# Patient Record
Sex: MALE
[De-identification: names, ages, dates, MRNs, and addresses within clinical notes are randomized per-mention and may not be internally consistent; named-entity substitution may affect disease eponyms.]

---

## 2018-07-05 ENCOUNTER — HOSPITAL ENCOUNTER (EMERGENCY)
Dept: HOSPITAL 88 - ER | Age: 68
Discharge: HOME | End: 2018-07-05
Payer: COMMERCIAL

## 2018-07-05 VITALS — HEIGHT: 72 IN | BODY MASS INDEX: 25.73 KG/M2 | WEIGHT: 190 LBS

## 2018-07-05 DIAGNOSIS — N30.90: ICD-10-CM

## 2018-07-05 DIAGNOSIS — N16: ICD-10-CM

## 2018-07-05 DIAGNOSIS — R10.32: Primary | ICD-10-CM

## 2018-07-05 DIAGNOSIS — N20.1: ICD-10-CM

## 2018-07-05 LAB
ALBUMIN SERPL-MCNC: 4.5 G/DL (ref 3.5–5)
ALBUMIN/GLOB SERPL: 1.5 {RATIO} (ref 0.8–2)
ALP SERPL-CCNC: 58 IU/L (ref 40–150)
ALT SERPL-CCNC: 34 IU/L (ref 0–55)
AMYLASE SERPL-CCNC: 93 U/L (ref 25–125)
ANION GAP SERPL CALC-SCNC: 15 MMOL/L (ref 8–16)
BACTERIA URNS QL MICRO: (no result) /HPF
BASOPHILS # BLD AUTO: 0 10*3/UL (ref 0–0.1)
BASOPHILS NFR BLD AUTO: 0.2 % (ref 0–1)
BILIRUB UR QL: NEGATIVE
BUN SERPL-MCNC: 20 MG/DL (ref 7–26)
BUN/CREAT SERPL: 14 (ref 6–25)
CALCIUM SERPL-MCNC: 10 MG/DL (ref 8.4–10.2)
CHLORIDE SERPL-SCNC: 104 MMOL/L (ref 98–107)
CLARITY UR: (no result)
CO2 SERPL-SCNC: 26 MMOL/L (ref 22–29)
COLOR UR: YELLOW
DEPRECATED NEUTROPHILS # BLD AUTO: 10.6 10*3/UL (ref 2.1–6.9)
DEPRECATED RBC URNS MANUAL-ACNC: (no result) /HPF (ref 0–5)
EGFRCR SERPLBLD CKD-EPI 2021: 49 ML/MIN (ref 60–?)
EOSINOPHIL # BLD AUTO: 0 10*3/UL (ref 0–0.4)
EOSINOPHIL NFR BLD AUTO: 0 % (ref 0–6)
EPI CELLS URNS QL MICRO: (no result) /LPF
ERYTHROCYTE [DISTWIDTH] IN CORD BLOOD: 13.3 % (ref 11.7–14.4)
GLOBULIN PLAS-MCNC: 3.1 G/DL (ref 2.3–3.5)
GLUCOSE SERPLBLD-MCNC: 133 MG/DL (ref 74–118)
HCT VFR BLD AUTO: 42.3 % (ref 38.2–49.6)
HGB BLD-MCNC: 15 G/DL (ref 14–18)
KETONES UR QL STRIP.AUTO: (no result)
LEUKOCYTE ESTERASE UR QL STRIP.AUTO: NEGATIVE
LIPASE SERPL-CCNC: 25 U/L (ref 8–78)
LYMPHOCYTES # BLD: 1.2 10*3/UL (ref 1–3.2)
LYMPHOCYTES NFR BLD AUTO: 9.5 % (ref 18–39.1)
MAGNESIUM SERPL-MCNC: 2 MG/DL (ref 1.3–2.1)
MCH RBC QN AUTO: 31.4 PG (ref 28–32)
MCHC RBC AUTO-ENTMCNC: 35.5 G/DL (ref 31–35)
MCV RBC AUTO: 88.7 FL (ref 81–99)
MONOCYTES # BLD AUTO: 1.1 10*3/UL (ref 0.2–0.8)
MONOCYTES NFR BLD AUTO: 8.3 % (ref 4.4–11.3)
NEUTS SEG NFR BLD AUTO: 81.5 % (ref 38.7–80)
NITRITE UR QL STRIP.AUTO: NEGATIVE
PLATELET # BLD AUTO: 240 X10E3/UL (ref 140–360)
POTASSIUM SERPL-SCNC: 4 MMOL/L (ref 3.5–5.1)
PROT UR QL STRIP.AUTO: NEGATIVE
RBC # BLD AUTO: 4.77 X10E6/UL (ref 4.3–5.7)
SODIUM SERPL-SCNC: 141 MMOL/L (ref 136–145)
SP GR UR STRIP: 1.01 (ref 1.01–1.02)
UROBILINOGEN UR STRIP-MCNC: 0.2 MG/DL (ref 0.2–1)
WBC #/AREA URNS HPF: (no result) /HPF (ref 0–5)

## 2018-07-05 PROCEDURE — 82150 ASSAY OF AMYLASE: CPT

## 2018-07-05 PROCEDURE — 74177 CT ABD & PELVIS W/CONTRAST: CPT

## 2018-07-05 PROCEDURE — 83690 ASSAY OF LIPASE: CPT

## 2018-07-05 PROCEDURE — 85025 COMPLETE CBC W/AUTO DIFF WBC: CPT

## 2018-07-05 PROCEDURE — 87086 URINE CULTURE/COLONY COUNT: CPT

## 2018-07-05 PROCEDURE — 36415 COLL VENOUS BLD VENIPUNCTURE: CPT

## 2018-07-05 PROCEDURE — 99284 EMERGENCY DEPT VISIT MOD MDM: CPT

## 2018-07-05 PROCEDURE — 83735 ASSAY OF MAGNESIUM: CPT

## 2018-07-05 PROCEDURE — 80053 COMPREHEN METABOLIC PANEL: CPT

## 2018-07-05 PROCEDURE — 81001 URINALYSIS AUTO W/SCOPE: CPT

## 2018-07-05 NOTE — DIAGNOSTIC IMAGING REPORT
CT Abdomen And Pelvis with Intravenous Contrast



INDICATION: Lower abdominal pain



TECHNIQUE: Thin collimation axial images obtained from the diaphragm to the

level of the pubic symphysis following the uneventful administration of  100 cc

of low osmolar, nonionic intravenous contrast.



RADIATION DOSE:

     Total DLP: 441.2  mGy*cm

     Estimated effective dose: (DLP x 0.015 x size factor) mSv

     CTDIvol has been reviewed. It is below the limits set by the Radiation

Protocol Committee (RPC).



COMPARISON: None.

 

ABDOMEN FINDINGS:



Lung Bases: Clear. There is a small hiatal hernia.



Liver: Decreased attenuation.  No evidence for mass.



Gallbladder: Present and appears normal. No biliary ductal dilatation.



Pancreas: Normal attenuation without mass or ductal dilatation.



Spleen: Normal in size.  No evidence of mass..



Adrenal Glands: No evidence for mass.



Kidneys: 



Right: Normal enhancement.  No soft tissue mass.  No hydronephrosis.



Left:  Diminutive enhancement. Diffuse perinephric inflammation. Calculus in

the lower pole measures 8 mm. Calculus in the interpolar region measures 8 mm.

The collecting system is distended. No perinephric fluid collection.  



Lymph Nodes: No enlarged abdominal or periaortic lymph nodes..



Aorta:   Normal in diameter



PELVIS FINDINGS: 



Bowel: 

Stomach:  Normal.

Small Bowel: Normal in caliber with normal wall thickness.  

Large Bowel: Normal in caliber with normal wall thickness.  

Appendix: Normal appendix.



Bladder: Mild mural thickening.

Ureters: The left ureter is distended throughout its course. No evidence of

calculus. Right ureter is normal..



The prostate gland measures 5.4 x 6.3 cm.



No free fluid or fluid collection.



Bones: Mild to moderate degenerative changes of L5-S1. No compression

deformities. There is a a lesion containing air in the right ilium measuring 15

mm near the SI joint.



Soft tissues: Small fat-containing left inguinal hernia.



IMPRESSION:



1.   Mild left hydroureteronephrosis without evidence of ureteral or bladder

calculus. A recently passed stone cannot be excluded.  Two intrarenal calculi

in the left kidney as described above.

2. Diffuse bladder wall thickening suggestive of outlet obstruction from

prostate hypertrophy.

3. Hepatic steatosis.

4. No evidence for bowel obstruction or inflammation. Small hiatal hernia.

Normal appendix.



Signed by: Dr. Parvez Lorenz MD on 7/5/2018 7:37 PM

## 2018-07-20 ENCOUNTER — HOSPITAL ENCOUNTER (OUTPATIENT)
Dept: HOSPITAL 88 - RAD | Age: 68
End: 2018-07-20
Attending: UROLOGY
Payer: COMMERCIAL

## 2018-07-20 DIAGNOSIS — N20.0: Primary | ICD-10-CM

## 2018-07-20 PROCEDURE — 74018 RADEX ABDOMEN 1 VIEW: CPT

## 2018-07-20 NOTE — DIAGNOSTIC IMAGING REPORT
PROCEDURE:X-RAY ABDOMEN - KUB

 

COMPARISON:CT of the abdomen and pelvis from 75 2018

INDICATIONS:RENAL STONE FOLLOW UP

 

FINDINGS:

One view of the abdomen (AP supine)

 

A 6 mm stone is projected over the upper pole of the left kidney

A 5 mm stone is projected over the lower pole of the left kidney.

No stones project over the expected locations of the ureters.

No right renal stones are identified.

 

There are no dilated loops of bowel to suggest obstruction. There are 

no masses evident There is no evidence of free air. No acute osseous 

abnormalities are present. There are degenerative changes of the 

sacroiliac joints.

 

 

CONCLUSION:

Left nephrolithiasis, appearing unchanged since the prior CT

 

 

Dictated by:  William Church M.D. on 7/20/2018 at 18:26     

Electronically approved by:  William Church M.D. on 7/20/2018 at 18:26

## 2018-10-18 ENCOUNTER — HOSPITAL ENCOUNTER (OUTPATIENT)
Dept: HOSPITAL 88 - RAD | Age: 68
End: 2018-10-18
Attending: UROLOGY
Payer: COMMERCIAL

## 2018-10-18 DIAGNOSIS — N20.0: Primary | ICD-10-CM

## 2018-10-18 PROCEDURE — 74018 RADEX ABDOMEN 1 VIEW: CPT

## 2018-10-18 NOTE — DIAGNOSTIC IMAGING REPORT
PROCEDURE:X-RAY ABDOMEN - KUB

 

COMPARISON:KUB 7/20/18.

 

INDICATIONS:CALCULUS OF KIDNEY

 

FINDINGS:

Bowel gas partially obscures visualization of the kidneys. Faint 

calcifications measuring 6 mm projects over the left upper kidney and 5 

mm calcification projects over the left lower kidney, likely 

representing stones. No evidence of calcification overlying the 

expected location of the ureters.  There is a non-obstructed bowel-gas 

pattern. There are no acute osseous abnormalities. The lung bases are 

clear.

 

CONCLUSION:

Exam partially limited by bowel gas obscuring the kidneys. Similar 

appearance of two 5-6 mm left renal stones. 

 

Dictated by:  KERMIT CORDOVA M.D. on 10/18/2018 at 11:16     

Electronically approved by:  KERMIT CORDOVA M.D. on 10/18/2018 at 11:16

## 2019-03-07 ENCOUNTER — HOSPITAL ENCOUNTER (OUTPATIENT)
Dept: HOSPITAL 88 - RAD | Age: 69
End: 2019-03-07
Attending: UROLOGY
Payer: MEDICARE

## 2019-03-07 DIAGNOSIS — N20.0: Primary | ICD-10-CM

## 2019-03-07 PROCEDURE — 74018 RADEX ABDOMEN 1 VIEW: CPT

## 2019-03-07 NOTE — DIAGNOSTIC IMAGING REPORT
Abdomen, 2 views dated 3/7/2019 at 9:37 AM.



History: Renal stones



Comparison: CT scan of the abdomen and pelvis dated 7/5/2018.



Findings: The intestinal gas pattern is nonobstructive. There no masses or

abnormal calcifications. Left pelvic calcification appears compatible with a

phlebolith. The osseous structures are intact.





IMPRESSION:

No acute abdominal abnormality.



Signed by: Dr. Sukhi Smith DO on 3/7/2019 11:40 AM

## 2019-07-08 ENCOUNTER — HOSPITAL ENCOUNTER (OUTPATIENT)
Dept: HOSPITAL 88 - RAD | Age: 69
End: 2019-07-08
Attending: UROLOGY
Payer: MEDICARE

## 2019-07-08 DIAGNOSIS — Z87.442: Primary | ICD-10-CM

## 2019-07-08 PROCEDURE — 74018 RADEX ABDOMEN 1 VIEW: CPT

## 2019-07-08 NOTE — DIAGNOSTIC IMAGING REPORT
Exam: KUB - 2 views



Clinical History: Renal calculi



Comparison: KUB of 3/7/2019



Findings: 

Nonobstructive bowel gas pattern. No evidence of free intraperitoneal air. No

evidence of abnormal calcification. No acute bony abnormality.



Impression:

No acute radiographic abnormality.



Signed by: Fiordaliza Dimas MD on 7/8/2019 12:36 PM

## 2019-10-01 ENCOUNTER — HOSPITAL ENCOUNTER (OUTPATIENT)
Dept: HOSPITAL 88 - RAD | Age: 69
End: 2019-10-01
Attending: UROLOGY
Payer: MEDICARE

## 2019-10-01 DIAGNOSIS — N20.0: Primary | ICD-10-CM

## 2019-10-01 PROCEDURE — 74018 RADEX ABDOMEN 1 VIEW: CPT

## 2019-10-01 NOTE — DIAGNOSTIC IMAGING REPORT
Exam: KUB - 2 views



Indication: Renal calculi



Comparison: KUB of 7/8/2019



Findings: 

No radiographically apparent renal calculi. Not certain bowel gas pattern. No

free air. The osseous structures appear unremarkable.



Impression:

No radiographically apparent renal calculi.



Signed by: Fiordaliza Dimas MD on 10/1/2019 12:54 PM

## 2020-03-03 NOTE — DIAGNOSTIC IMAGING REPORT
Exam: KUB - 2 views



Indication: Renal calculus



Comparison: Multiple prior KUBs, most recently 10/1/2019



Findings: 

No radiographically apparent renal calculi. Nonobstructive bowel gas pattern.

No free air. No acute osseous injury. Mild degenerative changes of both hip

joints.



Impression:

No radiographically apparent renal calculi.



Signed by: Fiordaliza Dimas MD on 3/3/2020 10:08 AM